# Patient Record
Sex: FEMALE | Race: WHITE | NOT HISPANIC OR LATINO | Employment: FULL TIME | URBAN - METROPOLITAN AREA
[De-identification: names, ages, dates, MRNs, and addresses within clinical notes are randomized per-mention and may not be internally consistent; named-entity substitution may affect disease eponyms.]

---

## 2023-06-05 ENCOUNTER — APPOINTMENT (OUTPATIENT)
Dept: ULTRASOUND IMAGING | Facility: CLINIC | Age: 33
End: 2023-06-05
Attending: EMERGENCY MEDICINE

## 2023-06-05 ENCOUNTER — HOSPITAL ENCOUNTER (EMERGENCY)
Facility: CLINIC | Age: 33
Discharge: HOME | End: 2023-06-05
Attending: EMERGENCY MEDICINE | Admitting: EMERGENCY MEDICINE

## 2023-06-05 VITALS
OXYGEN SATURATION: 100 % | DIASTOLIC BLOOD PRESSURE: 82 MMHG | RESPIRATION RATE: 18 BRPM | SYSTOLIC BLOOD PRESSURE: 137 MMHG | HEART RATE: 68 BPM | TEMPERATURE: 98.1 F

## 2023-06-05 DIAGNOSIS — N93.9 VAGINAL BLEEDING: ICD-10-CM

## 2023-06-05 DIAGNOSIS — O20.0 THREATENED MISCARRIAGE: ICD-10-CM

## 2023-06-05 LAB
ABO/RH(D): NORMAL
ALBUMIN UR-MCNC: NEGATIVE MG/DL
ANION GAP SERPL CALCULATED.3IONS-SCNC: 15 MMOL/L (ref 7–15)
APPEARANCE UR: CLEAR
BASOPHILS # BLD AUTO: 0 10E3/UL (ref 0–0.2)
BASOPHILS NFR BLD AUTO: 0 %
BILIRUB UR QL STRIP: NEGATIVE
BUN SERPL-MCNC: 11.4 MG/DL (ref 6–20)
CALCIUM SERPL-MCNC: 9.2 MG/DL (ref 8.6–10)
CHLORIDE SERPL-SCNC: 102 MMOL/L (ref 98–107)
COLOR UR AUTO: YELLOW
CREAT SERPL-MCNC: 0.78 MG/DL (ref 0.51–0.95)
DEPRECATED HCO3 PLAS-SCNC: 20 MMOL/L (ref 22–29)
EOSINOPHIL # BLD AUTO: 0.1 10E3/UL (ref 0–0.7)
EOSINOPHIL NFR BLD AUTO: 1 %
ERYTHROCYTE [DISTWIDTH] IN BLOOD BY AUTOMATED COUNT: 11.7 % (ref 10–15)
GFR SERPL CREATININE-BSD FRML MDRD: >90 ML/MIN/1.73M2
GLUCOSE SERPL-MCNC: 85 MG/DL (ref 70–99)
GLUCOSE UR STRIP-MCNC: 70 MG/DL
HCG INTACT+B SERPL-ACNC: ABNORMAL MIU/ML
HCT VFR BLD AUTO: 40.4 % (ref 35–47)
HGB BLD-MCNC: 14.4 G/DL (ref 11.7–15.7)
HGB UR QL STRIP: ABNORMAL
HOLD SPECIMEN: NORMAL
HOLD SPECIMEN: NORMAL
IMM GRANULOCYTES # BLD: 0 10E3/UL
IMM GRANULOCYTES NFR BLD: 0 %
KETONES UR STRIP-MCNC: 80 MG/DL
LEUKOCYTE ESTERASE UR QL STRIP: ABNORMAL
LYMPHOCYTES # BLD AUTO: 2 10E3/UL (ref 0.8–5.3)
LYMPHOCYTES NFR BLD AUTO: 18 %
MCH RBC QN AUTO: 31.7 PG (ref 26.5–33)
MCHC RBC AUTO-ENTMCNC: 35.6 G/DL (ref 31.5–36.5)
MCV RBC AUTO: 89 FL (ref 78–100)
MONOCYTES # BLD AUTO: 0.6 10E3/UL (ref 0–1.3)
MONOCYTES NFR BLD AUTO: 6 %
MUCOUS THREADS #/AREA URNS LPF: PRESENT /LPF
NEUTROPHILS # BLD AUTO: 8.1 10E3/UL (ref 1.6–8.3)
NEUTROPHILS NFR BLD AUTO: 75 %
NITRATE UR QL: NEGATIVE
NRBC # BLD AUTO: 0 10E3/UL
NRBC BLD AUTO-RTO: 0 /100
PH UR STRIP: 5.5 [PH] (ref 5–7)
PLATELET # BLD AUTO: 222 10E3/UL (ref 150–450)
POTASSIUM SERPL-SCNC: 4 MMOL/L (ref 3.4–5.3)
RBC # BLD AUTO: 4.54 10E6/UL (ref 3.8–5.2)
RBC URINE: 1 /HPF
SODIUM SERPL-SCNC: 137 MMOL/L (ref 136–145)
SP GR UR STRIP: 1.02 (ref 1–1.03)
SPECIMEN EXPIRATION DATE: NORMAL
SQUAMOUS EPITHELIAL: 1 /HPF
UROBILINOGEN UR STRIP-MCNC: NORMAL MG/DL
WBC # BLD AUTO: 10.7 10E3/UL (ref 4–11)
WBC URINE: 3 /HPF

## 2023-06-05 PROCEDURE — 76801 OB US < 14 WKS SINGLE FETUS: CPT

## 2023-06-05 PROCEDURE — 84702 CHORIONIC GONADOTROPIN TEST: CPT | Performed by: EMERGENCY MEDICINE

## 2023-06-05 PROCEDURE — 99285 EMERGENCY DEPT VISIT HI MDM: CPT | Mod: 25

## 2023-06-05 PROCEDURE — 85025 COMPLETE CBC W/AUTO DIFF WBC: CPT | Performed by: EMERGENCY MEDICINE

## 2023-06-05 PROCEDURE — 80048 BASIC METABOLIC PNL TOTAL CA: CPT | Performed by: EMERGENCY MEDICINE

## 2023-06-05 PROCEDURE — 86901 BLOOD TYPING SEROLOGIC RH(D): CPT | Performed by: EMERGENCY MEDICINE

## 2023-06-05 PROCEDURE — 81001 URINALYSIS AUTO W/SCOPE: CPT | Performed by: EMERGENCY MEDICINE

## 2023-06-05 PROCEDURE — 36415 COLL VENOUS BLD VENIPUNCTURE: CPT | Performed by: EMERGENCY MEDICINE

## 2023-06-05 PROCEDURE — 99284 EMERGENCY DEPT VISIT MOD MDM: CPT | Mod: 25

## 2023-06-05 PROCEDURE — 87086 URINE CULTURE/COLONY COUNT: CPT | Performed by: EMERGENCY MEDICINE

## 2023-06-05 ASSESSMENT — ACTIVITIES OF DAILY LIVING (ADL)
ADLS_ACUITY_SCORE: 35
ADLS_ACUITY_SCORE: 33

## 2023-06-05 NOTE — ED TRIAGE NOTES
"11 weeks pregnant. Having some vaginal bleeding. Hard first ultrasound today. Having some on and off \"gushing\" of blood. Has noticed small amounts of tissue. Feels pelvic pressure, but not pain. Bleeding isn't enough to feel a panty liner per patient.       "

## 2023-06-06 NOTE — ED NOTES
PIT/Triage Evaluation    Patient   Who is 11 weeks into gestation presented with vaginal bleeding. Patient had her first ultrasound this morning and stated everything was normal. Patient started bleeding after her ultrasound at 3 pm. Patient felt pressure before the ultrasound and afterwards. Patient states she bled last Tuesday ()  describes it as light spotting. Patient has no nausea currently but did experience morning sickness this morning. No current pressure or pain in the abdomen. No urinary symptoms, no diarrhea or blood in the stool.    Exam is notable for:    Patient Vitals for the past 24 hrs:   BP Temp Temp src Pulse Resp SpO2   23 1719 (!) 143/97 98.1  F (36.7  C) Temporal 87 20 100 %       Abd: Soft, non tender, non distended. Normal bowel sounds    Appropriate interventions for symptom management were initiated if applicable.  Appropriate diagnostic tests were initiated if indicated.    Important information for subsequent clinician:    Had some pelvic pressure before ultrasound this morning.  Symptoms started after the ultrasound this morning  Given abruptness of the bleeding would be concerned for miscarriage.  US ordered    I briefly evaluated the patient and developed an initial plan of care. I discussed this plan and explained that this brief interaction does not constitute a full evaluation. Patient/family understands that they should wait to be fully evaluated and discuss any test results with another clinician prior to leaving the hospital.     aMrcelo Mendes DO  [phone]

## 2023-06-06 NOTE — DISCHARGE INSTRUCTIONS
"Diagnosis: Anytime a woman who is pregnant has vaginal bleeding week and instructions for \"threatened miscarriage\".  This does not mean that you are going to have a miscarriage but gives you things to watch out for.  What do you do next:   Continue your home medications unless we have specifically changed them  Follow up as indicated below  I placed an order to have the [facility] primary care service contact you about establishing outpatient primary care.  I also have a phone number listed that you can call to establish care with OB/GYN.    When do you return: If you have uncontrollable pain, vaginal bleeding or you soak a pad edge to edge inside of an hour, lightheadedness/fainting, or any other symptoms that concern you, please return to the ED for reevaluation.    Thank you for allowing us to care for you today.    "

## 2023-06-06 NOTE — ED PROVIDER NOTES
History     Chief Complaint:  Vaginal Bleeding       HPI   Francisco Javier Ritter is a 32 year old  female, 11 weeks into gestation, who presents with vaginal bleeding. Patient had her first ultrasound this morning and stated everything was normal. Patient started bleeding after her ultrasound at 3 pm. Patient felt pressure in her abdomen beginning this morning, prior to her ultrasound. Patient states she bled last Tuesday (), describes it as light spotting. Patient has no nausea currently but did experience morning sickness this morning. No current pressure or pain in the abdomen. No urinary symptoms, no diarrhea or blood in the stool.    Independent Historian:    None - Patient Only    Review of External Notes:  None    ROS:  See HPI    Allergies:  Allergies have not been reviewed     Physical Exam     Patient Vitals for the past 24 hrs:   BP Temp Temp src Pulse Resp SpO2   23 2354 137/82 -- -- 68 18 100 %   23 1719 (!) 143/97 98.1  F (36.7  C) Temporal 87 20 100 %        Physical Exam    HENT:  mmm. Normal phonation.  Eyes: PERRL B/L  Neck: Supple  CV: Peripheral pulses in tact and regular  Resp: Speaking in full sentences without any respiratory distress  Abd: Soft, non tender, non distended. Normal bowel sounds  Musculoskeletal: No deformities noted  Skin: Warm, dry, well perfused  Neuro: Alert, no gross motor or sensory deficits,  gait stable  Psych: Calm        Emergency Department Course   Imaging:  US OB < 14 Weeks Single   Final Result   IMPRESSION:    1.  Single living intrauterine gestation at 11 weeks 6 days, EDC 2023.               Report per radiology    Laboratory:  Labs Ordered and Resulted from Time of ED Arrival to Time of ED Departure   BASIC METABOLIC PANEL - Abnormal       Result Value    Sodium 137      Potassium 4.0      Chloride 102      Carbon Dioxide (CO2) 20 (*)     Anion Gap 15      Urea Nitrogen 11.4      Creatinine 0.78      Calcium 9.2      Glucose 85      GFR  Estimate >90     HCG QUANTITATIVE PREGNANCY - Abnormal    hCG Quantitative 124,014 (*)    ROUTINE UA WITH MICROSCOPIC REFLEX TO CULTURE - Abnormal    Color Urine Yellow      Appearance Urine Clear      Glucose Urine 70 (*)     Bilirubin Urine Negative      Ketones Urine 80 (*)     Specific Gravity Urine 1.024      Blood Urine Moderate (*)     pH Urine 5.5      Protein Albumin Urine Negative      Urobilinogen Urine Normal      Nitrite Urine Negative      Leukocyte Esterase Urine Moderate (*)     Mucus Urine Present (*)     RBC Urine 1      WBC Urine 3      Squamous Epithelials Urine 1     CBC WITH PLATELETS AND DIFFERENTIAL    WBC Count 10.7      RBC Count 4.54      Hemoglobin 14.4      Hematocrit 40.4      MCV 89      MCH 31.7      MCHC 35.6      RDW 11.7      Platelet Count 222      % Neutrophils 75      % Lymphocytes 18      % Monocytes 6      % Eosinophils 1      % Basophils 0      % Immature Granulocytes 0      NRBCs per 100 WBC 0      Absolute Neutrophils 8.1      Absolute Lymphocytes 2.0      Absolute Monocytes 0.6      Absolute Eosinophils 0.1      Absolute Basophils 0.0      Absolute Immature Granulocytes 0.0      Absolute NRBCs 0.0     ABO AND RH    ABO/RH(D) A POS      SPECIMEN EXPIRATION DATE [id number]     URINE CULTURE      Emergency Department Course & Assessments:           Interventions:  Medications - No data to display     Assessments, Independent Interpretation, Consult/Discussion of ManagementTests:  ED Course as of 06/06/23 0156   Mon Jun 05, 2023 2316 I rechecked and updated the patient.         Social Determinants of Health affecting care:  None    Disposition:  The patient was discharged to home.     Impression & Plan    Code Status: No Order    Medical Decision Making:  This 32 year old female presents to the ED due to Vaginal Bleeding   . Please see the HPI and exam for specifics. A broad differential was considered including threatened miscarriage, vaginal trauma, etc.    Based on  the differential, exam, and any decision tools, the above workup was undertaken. Lab and imaging results were reviewed by me and are notable for reassuring labs and an ultrasound that demonstrated an intrauterine pregnancy with a reassuring heart rate..    No medications were required in the emergency department.    Based on this, I felt that the most likely etiology of their symptoms is vaginal bleeding with concern for threatened miscarriage.. I believe that a reasonable course of action is that they can be discharged.  I will encourage close outpatient follow-up/establishment of care with OB/GYN.  The patient can return anytime with new or worsening symptoms.    Anticipatory guidance given prior to discharge.    Diagnosis:    ICD-10-CM    1. Vaginal bleeding  N93.9 Primary Care Referral      2. Threatened miscarriage  O20.0 Primary Care Referral           Discharge Medications:  There are no discharge medications for this patient.     Scribe Disclosure:  ILibra, am serving as a scribe at 10:26 PM on 6/5/2023 to document services personally performed by Marcelo Mendes DO based on my observations and the provider's statements to me.    Scribe Disclosure:  ICharlene, am serving as a scribe at 11:18 PM on 6/5/2023 to document services personally performed by Marcelo Mendes DO based on my observations and the provider's statements to me.      6/5/2023   Marcelo Mendes DO Burns, Bradley Joseph, DO  06/06/23 0156

## 2023-06-07 LAB — BACTERIA UR CULT: NORMAL

## 2023-08-13 ENCOUNTER — HEALTH MAINTENANCE LETTER (OUTPATIENT)
Age: 33
End: 2023-08-13

## 2024-10-06 ENCOUNTER — HEALTH MAINTENANCE LETTER (OUTPATIENT)
Age: 34
End: 2024-10-06